# Patient Record
Sex: FEMALE | Race: WHITE | Employment: OTHER | URBAN - METROPOLITAN AREA
[De-identification: names, ages, dates, MRNs, and addresses within clinical notes are randomized per-mention and may not be internally consistent; named-entity substitution may affect disease eponyms.]

---

## 2017-01-10 PROBLEM — J81.1 PULMONARY EDEMA: Status: ACTIVE | Noted: 2017-01-10

## 2017-01-10 PROBLEM — J96.00 ACUTE RESPIRATORY FAILURE (HCC): Status: ACTIVE | Noted: 2017-01-10

## 2017-01-11 ENCOUNTER — TELEPHONE (OUTPATIENT)
Dept: FAMILY MEDICINE CLINIC | Age: 77
End: 2017-01-11

## 2017-01-12 PROBLEM — I50.33 ACUTE ON CHRONIC DIASTOLIC (CONGESTIVE) HEART FAILURE (HCC): Status: ACTIVE | Noted: 2017-01-12

## 2017-01-13 PROBLEM — J81.0 ACUTE PULMONARY EDEMA (HCC): Status: ACTIVE | Noted: 2017-01-10

## 2017-01-13 PROBLEM — J96.01 ACUTE RESPIRATORY FAILURE WITH HYPOXIA (HCC): Status: ACTIVE | Noted: 2017-01-10

## 2017-01-16 ENCOUNTER — TELEPHONE (OUTPATIENT)
Dept: CARDIAC REHAB | Age: 77
End: 2017-01-16

## 2017-01-17 ENCOUNTER — TELEPHONE (OUTPATIENT)
Dept: FAMILY MEDICINE CLINIC | Age: 77
End: 2017-01-17

## 2017-01-19 ENCOUNTER — OFFICE VISIT (OUTPATIENT)
Dept: FAMILY MEDICINE CLINIC | Age: 77
End: 2017-01-19

## 2017-01-19 VITALS
TEMPERATURE: 98.4 F | BODY MASS INDEX: 35.12 KG/M2 | OXYGEN SATURATION: 94 % | HEART RATE: 66 BPM | SYSTOLIC BLOOD PRESSURE: 90 MMHG | DIASTOLIC BLOOD PRESSURE: 50 MMHG | WEIGHT: 192 LBS

## 2017-01-19 DIAGNOSIS — I50.30 DIASTOLIC CONGESTIVE HEART FAILURE, UNSPECIFIED CONGESTIVE HEART FAILURE CHRONICITY: Primary | ICD-10-CM

## 2017-01-19 PROCEDURE — 99214 OFFICE O/P EST MOD 30 MIN: CPT | Performed by: FAMILY MEDICINE

## 2017-01-20 ENCOUNTER — TELEPHONE (OUTPATIENT)
Dept: FAMILY MEDICINE CLINIC | Age: 77
End: 2017-01-20

## 2017-01-23 RX ORDER — CARVEDILOL 12.5 MG/1
TABLET ORAL
Qty: 180 TABLET | Refills: 1 | Status: SHIPPED | OUTPATIENT
Start: 2017-01-23 | End: 2017-07-25 | Stop reason: SDUPTHER

## 2017-02-22 RX ORDER — PRAVASTATIN SODIUM 20 MG
TABLET ORAL
Qty: 90 TABLET | Refills: 1 | Status: SHIPPED | OUTPATIENT
Start: 2017-02-22 | End: 2017-08-22 | Stop reason: SDUPTHER

## 2017-02-28 RX ORDER — LISINOPRIL 10 MG/1
TABLET ORAL
Qty: 90 TABLET | Refills: 3 | Status: SHIPPED | OUTPATIENT
Start: 2017-02-28 | End: 2017-03-27 | Stop reason: DRUGHIGH

## 2017-02-28 RX ORDER — LISINOPRIL 20 MG/1
20 TABLET ORAL DAILY
Qty: 30 TABLET | Refills: 3 | Status: SHIPPED | OUTPATIENT
Start: 2017-02-28 | End: 2017-03-27 | Stop reason: SDUPTHER

## 2017-03-10 RX ORDER — FUROSEMIDE 20 MG/1
TABLET ORAL
Qty: 90 TABLET | Refills: 0 | Status: SHIPPED | OUTPATIENT
Start: 2017-03-10 | End: 2017-06-04 | Stop reason: SDUPTHER

## 2017-03-27 ENCOUNTER — OFFICE VISIT (OUTPATIENT)
Dept: FAMILY MEDICINE CLINIC | Age: 77
End: 2017-03-27

## 2017-03-27 VITALS — HEART RATE: 68 BPM | OXYGEN SATURATION: 98 % | DIASTOLIC BLOOD PRESSURE: 50 MMHG | SYSTOLIC BLOOD PRESSURE: 124 MMHG

## 2017-03-27 DIAGNOSIS — E03.9 HYPOTHYROIDISM, UNSPECIFIED TYPE: ICD-10-CM

## 2017-03-27 DIAGNOSIS — Z79.4 TYPE 2 DIABETES MELLITUS WITH COMPLICATION, WITH LONG-TERM CURRENT USE OF INSULIN (HCC): ICD-10-CM

## 2017-03-27 DIAGNOSIS — E11.8 TYPE 2 DIABETES MELLITUS WITH COMPLICATION, WITH LONG-TERM CURRENT USE OF INSULIN (HCC): ICD-10-CM

## 2017-03-27 DIAGNOSIS — I10 ESSENTIAL HYPERTENSION: Primary | ICD-10-CM

## 2017-03-27 LAB
A/G RATIO: 1.7 (ref 1.1–2.2)
ALBUMIN SERPL-MCNC: 4.3 G/DL (ref 3.4–5)
ALP BLD-CCNC: 82 U/L (ref 40–129)
ALT SERPL-CCNC: 25 U/L (ref 10–40)
ANION GAP SERPL CALCULATED.3IONS-SCNC: 15 MMOL/L (ref 3–16)
AST SERPL-CCNC: 21 U/L (ref 15–37)
BASOPHILS ABSOLUTE: 0 K/UL (ref 0–0.2)
BASOPHILS RELATIVE PERCENT: 0.6 %
BILIRUB SERPL-MCNC: 0.6 MG/DL (ref 0–1)
BUN BLDV-MCNC: 23 MG/DL (ref 7–20)
CALCIUM SERPL-MCNC: 9.6 MG/DL (ref 8.3–10.6)
CHLORIDE BLD-SCNC: 101 MMOL/L (ref 99–110)
CO2: 29 MMOL/L (ref 21–32)
CREAT SERPL-MCNC: 1 MG/DL (ref 0.6–1.2)
EOSINOPHILS ABSOLUTE: 0.1 K/UL (ref 0–0.6)
EOSINOPHILS RELATIVE PERCENT: 2.5 %
GFR AFRICAN AMERICAN: >60
GFR NON-AFRICAN AMERICAN: 54
GLOBULIN: 2.6 G/DL
GLUCOSE BLD-MCNC: 144 MG/DL (ref 70–99)
HCT VFR BLD CALC: 38 % (ref 36–48)
HEMATOLOGY PATH CONSULT: NO
HEMOGLOBIN: 12.3 G/DL (ref 12–16)
LYMPHOCYTES ABSOLUTE: 1.4 K/UL (ref 1–5.1)
LYMPHOCYTES RELATIVE PERCENT: 23.6 %
MCH RBC QN AUTO: 36.3 PG (ref 26–34)
MCHC RBC AUTO-ENTMCNC: 32.3 G/DL (ref 31–36)
MCV RBC AUTO: 112.5 FL (ref 80–100)
MONOCYTES ABSOLUTE: 0.3 K/UL (ref 0–1.3)
MONOCYTES RELATIVE PERCENT: 4.2 %
NEUTROPHILS ABSOLUTE: 4.2 K/UL (ref 1.7–7.7)
NEUTROPHILS RELATIVE PERCENT: 69.1 %
PDW BLD-RTO: 14.2 % (ref 12.4–15.4)
PLATELET # BLD: 147 K/UL (ref 135–450)
PMV BLD AUTO: 11 FL (ref 5–10.5)
POTASSIUM SERPL-SCNC: 4.8 MMOL/L (ref 3.5–5.1)
RBC # BLD: 3.37 M/UL (ref 4–5.2)
SODIUM BLD-SCNC: 145 MMOL/L (ref 136–145)
TOTAL PROTEIN: 6.9 G/DL (ref 6.4–8.2)
TSH REFLEX FT4: 0.44 UIU/ML (ref 0.27–4.2)
WBC # BLD: 6.1 K/UL (ref 4–11)

## 2017-03-27 PROCEDURE — 99214 OFFICE O/P EST MOD 30 MIN: CPT | Performed by: FAMILY MEDICINE

## 2017-03-27 PROCEDURE — 36415 COLL VENOUS BLD VENIPUNCTURE: CPT | Performed by: FAMILY MEDICINE

## 2017-03-27 RX ORDER — LISINOPRIL 20 MG/1
20 TABLET ORAL DAILY
Qty: 90 TABLET | Refills: 1 | Status: SHIPPED | OUTPATIENT
Start: 2017-03-27 | End: 2017-06-28 | Stop reason: SDUPTHER

## 2017-03-27 RX ORDER — POTASSIUM CHLORIDE 750 MG/1
TABLET, FILM COATED, EXTENDED RELEASE ORAL
Qty: 90 TABLET | Refills: 0 | Status: SHIPPED | OUTPATIENT
Start: 2017-03-27 | End: 2017-06-24 | Stop reason: SDUPTHER

## 2017-03-28 LAB
ESTIMATED AVERAGE GLUCOSE: 125.5 MG/DL
HBA1C MFR BLD: 6 %

## 2017-04-13 RX ORDER — GEMFIBROZIL 600 MG/1
TABLET, FILM COATED ORAL
Qty: 90 TABLET | Refills: 0 | Status: SHIPPED | OUTPATIENT
Start: 2017-04-13 | End: 2017-07-17 | Stop reason: SDUPTHER

## 2017-05-24 RX ORDER — LEVOTHYROXINE SODIUM 137 UG/1
TABLET ORAL
Qty: 90 TABLET | Refills: 0 | Status: SHIPPED | OUTPATIENT
Start: 2017-05-24 | End: 2017-08-22 | Stop reason: SDUPTHER

## 2017-06-05 RX ORDER — FUROSEMIDE 20 MG/1
TABLET ORAL
Qty: 90 TABLET | Refills: 0 | Status: ON HOLD | OUTPATIENT
Start: 2017-06-05 | End: 2017-09-02 | Stop reason: HOSPADM

## 2017-06-26 ENCOUNTER — OFFICE VISIT (OUTPATIENT)
Dept: FAMILY MEDICINE CLINIC | Age: 77
End: 2017-06-26

## 2017-06-26 VITALS
SYSTOLIC BLOOD PRESSURE: 134 MMHG | OXYGEN SATURATION: 96 % | HEART RATE: 69 BPM | BODY MASS INDEX: 35.85 KG/M2 | DIASTOLIC BLOOD PRESSURE: 48 MMHG | WEIGHT: 196 LBS

## 2017-06-26 DIAGNOSIS — E11.8 TYPE 2 DIABETES MELLITUS WITH COMPLICATION, WITH LONG-TERM CURRENT USE OF INSULIN (HCC): Primary | ICD-10-CM

## 2017-06-26 DIAGNOSIS — Z79.4 TYPE 2 DIABETES MELLITUS WITH COMPLICATION, WITH LONG-TERM CURRENT USE OF INSULIN (HCC): Primary | ICD-10-CM

## 2017-06-26 LAB — HBA1C MFR BLD: 5.9 %

## 2017-06-26 PROCEDURE — 83036 HEMOGLOBIN GLYCOSYLATED A1C: CPT | Performed by: FAMILY MEDICINE

## 2017-06-26 PROCEDURE — 99213 OFFICE O/P EST LOW 20 MIN: CPT | Performed by: FAMILY MEDICINE

## 2017-06-26 RX ORDER — POTASSIUM CHLORIDE 750 MG/1
TABLET, EXTENDED RELEASE ORAL
Qty: 90 TABLET | Refills: 0 | Status: ON HOLD | OUTPATIENT
Start: 2017-06-26 | End: 2017-11-21

## 2017-06-28 DIAGNOSIS — I10 ESSENTIAL HYPERTENSION: ICD-10-CM

## 2017-06-28 RX ORDER — LISINOPRIL 20 MG/1
20 TABLET ORAL DAILY
Qty: 90 TABLET | Refills: 1 | Status: SHIPPED | OUTPATIENT
Start: 2017-06-28

## 2017-07-17 RX ORDER — GEMFIBROZIL 600 MG/1
TABLET, FILM COATED ORAL
Qty: 90 TABLET | Refills: 0 | Status: SHIPPED | OUTPATIENT
Start: 2017-07-17 | End: 2017-10-25 | Stop reason: SDUPTHER

## 2017-07-25 RX ORDER — CARVEDILOL 12.5 MG/1
TABLET ORAL
Qty: 180 TABLET | Refills: 0 | Status: SHIPPED | OUTPATIENT
Start: 2017-07-25 | End: 2017-10-22 | Stop reason: SDUPTHER

## 2017-07-28 RX ORDER — HUMAN INSULIN 100 [IU]/ML
INJECTION, SOLUTION SUBCUTANEOUS
Qty: 3 VIAL | Refills: 0 | Status: SHIPPED | OUTPATIENT
Start: 2017-07-28 | End: 2017-09-25 | Stop reason: SDUPTHER

## 2017-08-22 RX ORDER — LEVOTHYROXINE SODIUM 137 UG/1
TABLET ORAL
Qty: 90 TABLET | Refills: 0 | Status: ON HOLD | OUTPATIENT
Start: 2017-08-22 | End: 2017-11-22 | Stop reason: SDUPTHER

## 2017-08-22 RX ORDER — PRAVASTATIN SODIUM 20 MG
TABLET ORAL
Qty: 90 TABLET | Refills: 0 | Status: SHIPPED | OUTPATIENT
Start: 2017-08-22 | End: 2017-11-16 | Stop reason: SDUPTHER

## 2017-08-25 ENCOUNTER — TELEPHONE (OUTPATIENT)
Dept: ORTHOPEDIC SURGERY | Age: 77
End: 2017-08-25

## 2017-08-25 PROBLEM — I25.10 CAD (CORONARY ARTERY DISEASE): Status: ACTIVE | Noted: 2017-08-25

## 2017-08-25 PROBLEM — I50.9 CHF (CONGESTIVE HEART FAILURE) (HCC): Status: ACTIVE | Noted: 2017-08-25

## 2017-09-05 RX ORDER — FUROSEMIDE 20 MG/1
TABLET ORAL
Qty: 90 TABLET | Refills: 0 | Status: SHIPPED | OUTPATIENT
Start: 2017-09-05 | End: 2017-12-01 | Stop reason: SDUPTHER

## 2017-09-06 ENCOUNTER — TELEPHONE (OUTPATIENT)
Dept: FAMILY MEDICINE CLINIC | Age: 77
End: 2017-09-06

## 2017-09-06 NOTE — TELEPHONE ENCOUNTER
Norma Dutta with [address] called and states that the patient was discharged from the hospital this past weekend and Norma Dutta wants to know if Dr. Nola Boss would sign home care orders for nursing and physical therapy.

## 2017-09-06 NOTE — TELEPHONE ENCOUNTER
Dilan Ochoa called back stating that she spoke with [facility] and she needs the office note to include vital signs, health history, and medications.  She would like this faxed to [phone]

## 2017-09-07 ENCOUNTER — TELEPHONE (OUTPATIENT)
Dept: FAMILY MEDICINE CLINIC | Age: 77
End: 2017-09-07

## 2017-09-07 NOTE — TELEPHONE ENCOUNTER
Pastora Escamilla from [facility] wants to let Chastity Crouch know that pt did not have referral for f/u home care after her ER visit this past week on 8/25/17 but pt was refusing and she thinks that is why f/u was not done. Pt is admitted today for home care ([facility]) and she will be evaluated later today by them. Pt's vitals is good she is still having a tremendous amount of pain in mid back. Pt said 10 on pain scale with movement. Pt is taking hydrocodone for pain 3 - 4 times a day for last week. Pt states it is taking the edge off pain but that is it. Pastora Escamilla will send a detailed report to Chastity Crouch.

## 2017-09-25 ENCOUNTER — OFFICE VISIT (OUTPATIENT)
Dept: FAMILY MEDICINE CLINIC | Age: 77
End: 2017-09-25

## 2017-09-25 VITALS
SYSTOLIC BLOOD PRESSURE: 126 MMHG | WEIGHT: 185 LBS | OXYGEN SATURATION: 96 % | DIASTOLIC BLOOD PRESSURE: 56 MMHG | HEART RATE: 83 BPM | BODY MASS INDEX: 33.84 KG/M2

## 2017-09-25 DIAGNOSIS — Z23 NEED FOR PROPHYLACTIC VACCINATION AND INOCULATION AGAINST INFLUENZA: ICD-10-CM

## 2017-09-25 PROCEDURE — G0008 ADMIN INFLUENZA VIRUS VAC: HCPCS | Performed by: FAMILY MEDICINE

## 2017-09-25 PROCEDURE — 90662 IIV NO PRSV INCREASED AG IM: CPT | Performed by: FAMILY MEDICINE

## 2017-09-25 PROCEDURE — 99213 OFFICE O/P EST LOW 20 MIN: CPT | Performed by: FAMILY MEDICINE

## 2017-09-25 RX ORDER — OXYCODONE AND ACETAMINOPHEN 7.5; 325 MG/1; MG/1
1 TABLET ORAL EVERY 4 HOURS PRN
Qty: 42 TABLET | Refills: 0 | Status: SHIPPED | OUTPATIENT
Start: 2017-09-25 | End: 2017-10-02

## 2017-09-27 RX ORDER — POTASSIUM CHLORIDE 750 MG/1
TABLET, FILM COATED, EXTENDED RELEASE ORAL
Qty: 90 TABLET | Refills: 0 | Status: SHIPPED | OUTPATIENT
Start: 2017-09-27

## 2017-10-23 RX ORDER — CARVEDILOL 12.5 MG/1
TABLET ORAL
Qty: 180 TABLET | Refills: 0 | Status: SHIPPED | OUTPATIENT
Start: 2017-10-23 | End: 2018-01-22 | Stop reason: SDUPTHER

## 2017-10-25 RX ORDER — GEMFIBROZIL 600 MG/1
TABLET, FILM COATED ORAL
Qty: 90 TABLET | Refills: 0 | Status: SHIPPED | OUTPATIENT
Start: 2017-10-25

## 2017-11-16 RX ORDER — PRAVASTATIN SODIUM 20 MG
TABLET ORAL
Qty: 90 TABLET | Refills: 0 | Status: SHIPPED | OUTPATIENT
Start: 2017-11-16 | End: 2018-02-18 | Stop reason: SDUPTHER

## 2017-11-16 RX ORDER — HUMAN INSULIN 100 [IU]/ML
INJECTION, SUSPENSION SUBCUTANEOUS
Qty: 3 VIAL | Refills: 2 | Status: ON HOLD | OUTPATIENT
Start: 2017-11-16 | End: 2017-12-18 | Stop reason: HOSPADM

## 2017-11-21 PROBLEM — J18.9 PNEUMONIA: Status: ACTIVE | Noted: 2017-11-21

## 2017-11-21 PROBLEM — J96.01 ACUTE RESPIRATORY FAILURE WITH HYPOXIA (HCC): Status: ACTIVE | Noted: 2017-11-21

## 2017-11-26 ENCOUNTER — CARE COORDINATION (OUTPATIENT)
Dept: CASE MANAGEMENT | Age: 77
End: 2017-11-26

## 2017-11-26 NOTE — CARE COORDINATION
[facility] Transitions Initial Follow Up Call    Call within 2 business days of discharge: Yes    Patient: Eloisa Saleh Patient : 1940   MRN: [id number]  Reason for Admission: hypoxia and community acquired RLL pneumonia   Discharge Date: 17 RARS: [facility] Risk Score: 32     Spoke with: [facility]    Non-face-to-face services provided:  Obtained and reviewed discharge summary and/or continuity of care documents    Care Transitions 24 Hour Call    Do you have any ongoing symptoms?:  No  Do you have a copy of your discharge instructions?:  Yes  Do you have all of your prescriptions and are they filled?:  Yes  Have you been contacted by a [facility] Pharmacist?:  No  Have you scheduled your follow up appointment?:  Yes  How are you going to get to your appointment?:  Car - family or friend to transport  Were you discharged with any Home Care or Post Acute Services:  Yes  Post Acute Services:  Home Health (Comment: Dana)  Patient DME:  Paralee Ian, Wheelchair  Do you have support at home?:  Partner/Spouse/SO  Do you feel like you have everything you need to keep you well at home?:  Yes  Are you an active caregiver in your home?:  Yes  Care Transitions Interventions       Janelle Loya reports she is feeling okay, denies any concerning symptoms. Asked if she weighed herself, states she has not yet as she is just waking up. States she has all her prescriptions and refused medication review. Informed her of follow up appointment on 17 with NP at [facility]. States her Daughter will provide transportation. Informed of need to schedule follow up CXR in 4-6 weeks, Pulmonology in 4-6 weeks, she voiced understanding. Denies any needs, questions or concerns at this time. Is agreeable to care transition.  Jes Ramesh LPN     Care  [phone]    Hanna 5, a nurse is scheduled to visit today.      Follow Up  Future Appointments  Date Time Provider Gina Sita   11/28/2017 1:00 PM Jin Xiao NP [address]   1/8/2018 1:15 PM Alanna Apley, MD [facility]   1/15/2018 2:20 PM MD Ragini Urrutia [facility]

## 2017-11-27 ENCOUNTER — TELEPHONE (OUTPATIENT)
Dept: FAMILY MEDICINE CLINIC | Age: 77
End: 2017-11-27

## 2017-11-27 ENCOUNTER — TELEPHONE (OUTPATIENT)
Dept: PHARMACY | Facility: CLINIC | Age: 77
End: 2017-11-27

## 2017-11-27 NOTE — TELEPHONE ENCOUNTER
Jada Martin with [address] [place] [address] called and states that the patient was discharged from [facility] on Saturday 11.25.17. Jada Martin wanted to know if Dr. Leontine Angelucci will sign orders for skilled nursing. Please advise.

## 2017-11-27 NOTE — TELEPHONE ENCOUNTER
CLINICAL PHARMACY NOTE - Post-Discharge Transitions of Care [facility])    Patient discharged from [facility] on 11/25/2017. First attempt made to reach patient by telephone for medication review. Patient currently sleeping, requests call back tomorrow. Will continue to attempt to contact patient by telephone as appropriate.     Chance Cunningham, PharmD  Clinical Pharmacy Specialist  O: [phone]  C: [address] [place] [address]  [phone], Option 7    =======================================================    For Pharmacy Admin Tracking Only  TCM Call Made?: Yes

## 2017-11-28 ENCOUNTER — CARE COORDINATION (OUTPATIENT)
Dept: CASE MANAGEMENT | Age: 77
End: 2017-11-28

## 2017-11-28 NOTE — TELEPHONE ENCOUNTER
CLINICAL PHARMACY NOTE - Post-Discharge Transitions of Care ([facility])    Second attempt made to reach patient by telephone for medication review. Left voice message for patient to return clinician's phone call to [phone]. Will prepare letter and mail to patient.      Mel Hui PharmD, [facility] Specialist  O: [phone]  C: [address] [place] [address]  [phone], Option 7    =======================================================    For Pharmacy Admin Tracking Only  TCM Call Made?: Yes  [facility] Select Patient?: Yes  Outreach Status: Patient Unreachable  Care Coordinator Outreach to Patient?: Yes  Provider Contacted?: No  Time Spent (min): 5

## 2017-11-28 NOTE — CARE COORDINATION
[facility] Transitions Follow Up Call    2017    Patient: Eloisa Saleh  Patient : 1940   MRN: [id number]  Reason for Admission: Pneumonia  Discharge Date: 17 RARS: Risk Score: 26 CMRS 11       Attempted to reach patient via phone for care transition, no answer. VM left on home phone stating purpose of call along with my contact information requesting a return call. Mobile phone has VM box that is not set up, unable to leave message.      Quiana Bustamante RN  Care Transitions Coordinator      Follow Up  Future Appointments  Date Time Provider Gina Buckner   2017 3:40 PM YAYO Campbell [facility]   2017 3:00 PM Aicha Moralez NP [address]   2018 1:15 PM MD ARNOLDO Sweeney [facility]   1/15/2018 2:20 PM MD Megan Sebastian [facility]       Quiana Bustamante RN

## 2017-11-30 NOTE — CARE COORDINATION
[facility] Transitions Follow Up Call    2017    Patient: Claire Matute  Patient : 1940   MRN: [id number]  Reason for Admission: Pneumonia  Discharge Date: 17 RARS: Risk Score: 26 CMRS 11       3rd and final attempt made to reach patient for initial post hospital follow up call. Left a voice message for patient with my contact information and informed of final outreach attempt. Also left reminder about her appt today with Roxanne Bolden at 3pm.  VM box not set up on mobile phone.     Keyur Martin RN  Care Transitions Coordinator  [phone]          Follow Up  Future Appointments  Date Time Provider Gina Buckner   2017 3:00 PM YAYO Arzola [facility]   2017 3:00 PM YAYO Arzola [facility]   2018 1:15 PM MD ARNOLDO Contreras [facility]   1/15/2018 2:20 PM MD Markus Louise [facility]       Keyur Martin RN

## 2017-11-30 NOTE — ADDENDUM NOTE
Encounter addended by: Anna Wilson MA on: 11/30/2017  9:29 AM<BR>    Actions taken: Letter status changed

## 2017-12-01 RX ORDER — FUROSEMIDE 20 MG/1
TABLET ORAL
Qty: 90 TABLET | Refills: 0 | Status: ON HOLD | OUTPATIENT
Start: 2017-12-01 | End: 2017-12-18

## 2017-12-13 PROBLEM — Z01.810 HIGH RISK SURGERY, PRE-OPERATIVE CARDIOVASCULAR EXAMINATION: Status: ACTIVE | Noted: 2017-12-13

## 2017-12-13 PROBLEM — S82.891A BILATERAL ANKLE FRACTURES, CLOSED, INITIAL ENCOUNTER: Status: ACTIVE | Noted: 2017-12-13

## 2017-12-13 PROBLEM — S82.892A BILATERAL ANKLE FRACTURES: Status: ACTIVE | Noted: 2017-12-13

## 2017-12-13 PROBLEM — S82.891A BILATERAL ANKLE FRACTURES: Status: ACTIVE | Noted: 2017-12-13

## 2017-12-13 PROBLEM — S82.892A BILATERAL ANKLE FRACTURES, CLOSED, INITIAL ENCOUNTER: Status: ACTIVE | Noted: 2017-12-13

## 2017-12-19 PROBLEM — I34.2 NON-RHEUMATIC MITRAL VALVE STENOSIS: Status: ACTIVE | Noted: 2017-12-19

## 2017-12-19 PROBLEM — I48.0 PAF (PAROXYSMAL ATRIAL FIBRILLATION) (HCC): Status: ACTIVE | Noted: 2017-12-19

## 2017-12-20 ENCOUNTER — TELEPHONE (OUTPATIENT)
Dept: FAMILY MEDICINE CLINIC | Age: 77
End: 2017-12-20

## 2017-12-20 NOTE — TELEPHONE ENCOUNTER
Patient's daughter is calling regarding the patient being dismissed from the practice for no shows. Daughter stated the patient has been so sick that she has been unable to get her out. Stated the patient did have a nurse coming to see her and the patient would not tell her until the day of how sick she was to cancel her appt. She said the patient had pneumonia and the flu and she was unable to make it to her appts. Daughter stated when she was able to get her out she fell and broke both of her ankles. Patient is currently in [facility] recovering from the ankle surgery. Jeff Modi stated that the patient is going to a rehab facility for the next four to five months and then she plans on putting her into a nursing home. She is asking if Reba Melendez can still be her PCP because it is going to be hard for her to find a new doctor with her mother's condition. She stated that she will only need him if she ever has to take her out of the nursing home and possibly if the rehab facility needs to speak to him at all.

## 2018-01-09 ENCOUNTER — TELEPHONE (OUTPATIENT)
Dept: FAMILY MEDICINE CLINIC | Age: 78
End: 2018-01-09

## 2018-01-09 NOTE — TELEPHONE ENCOUNTER
Sharmin King from [address] is calling about the Face to Face she has faxed over three times since November and has not received anything back. She also needs the office notes from patient's last OV with [address] faxed to [phone].

## 2018-01-09 NOTE — TELEPHONE ENCOUNTER
Spoke with Earl Myrick and told her that per [facility] it was faxed out yesterday. [facility] will check tomorrow when she gets back to [facility] office if it did not go through. Earl Myrick will also call me back if she did not get it.

## 2018-01-15 ENCOUNTER — OFFICE VISIT (OUTPATIENT)
Dept: ENDOCRINOLOGY | Age: 78
End: 2018-01-15

## 2018-01-15 VITALS
WEIGHT: 194 LBS | SYSTOLIC BLOOD PRESSURE: 130 MMHG | DIASTOLIC BLOOD PRESSURE: 52 MMHG | HEART RATE: 68 BPM | RESPIRATION RATE: 12 BRPM | HEIGHT: 65 IN | BODY MASS INDEX: 32.32 KG/M2 | OXYGEN SATURATION: 97 %

## 2018-01-15 DIAGNOSIS — Z79.4 TYPE 2 DIABETES MELLITUS WITH COMPLICATION, WITH LONG-TERM CURRENT USE OF INSULIN (HCC): Primary | ICD-10-CM

## 2018-01-15 DIAGNOSIS — E11.8 TYPE 2 DIABETES MELLITUS WITH COMPLICATION, WITH LONG-TERM CURRENT USE OF INSULIN (HCC): Primary | ICD-10-CM

## 2018-01-15 PROCEDURE — 99213 OFFICE O/P EST LOW 20 MIN: CPT | Performed by: INTERNAL MEDICINE

## 2018-01-15 NOTE — PROGRESS NOTES
breakfast) 30 capsule 1    budesonide-formoterol (SYMBICORT) 80-4.5 MCG/ACT AERO Inhale 2 puffs into the lungs 2 times daily 1 Inhaler 3    albuterol-ipratropium (COMBIVENT RESPIMAT)  MCG/ACT AERS inhaler Inhale 1 puff into the lungs every 4 hours as needed for Wheezing 1 Inhaler 2    levothyroxine (SYNTHROID) 137 MCG tablet TAKE ONE TABLET BY MOUTH DAILY 90 tablet 1    pravastatin (PRAVACHOL) 20 MG tablet TAKE ONE TABLET BY MOUTH DAILY 90 tablet 0    gemfibrozil (LOPID) 600 MG tablet TAKE ONE TABLET BY MOUTH DAILY 90 tablet 0    carvedilol (COREG) 12.5 MG tablet TAKE ONE TABLET BY MOUTH TWICE A DAY WITH MEALS 180 tablet 0    potassium chloride (KLOR-CON) 10 MEQ extended release tablet TAKE ONE TABLET BY MOUTH DAILY 90 tablet 0    lisinopril (PRINIVIL;ZESTRIL) 20 MG tablet Take 1 tablet by mouth daily 90 tablet 1    calcium carbonate (OSCAL) 500 MG TABS tablet Take 500 mg by mouth daily      traZODone (DESYREL) 50 MG tablet TAKE ONE TO TWO TABLETS BY MOUTH PRN AT BEDTIME 180 tablet 2    glucose blood VI test strips (ACCU-CHEK SUBHASH PLUS) strip USE TO TEST BLOOD GLUCOSE LEVELS FOUR TIMES A DAY AS NEEDED. DX CODE 250.00 150 each 6    Accu-Chek Softclix Lancets MISC 1 each by Does not apply route 4 times daily Dx Code 250.00 150 each 6    Omega-3 Fatty Acids (OMEGA-3 PLUS PO) Take 1 capsule by mouth daily.  triamcinolone (NASACORT AQ) 55 MCG/ACT nasal inhaler 2 sprays by Nasal route daily.  Blood Glucose Monitoring Suppl (ACCU-CHEK ACTIVE CARE KIT) KIT 4 times a day 1 kit 0    aspirin 81 MG EC tablet Take 162 mg by mouth daily Take two daily, stopped for surgery      Coenzyme Q10 (CO Q-10) 100 MG CAPS Take 1 capsule by mouth Daily. Vitals:    01/15/18 1423   BP: (!) 130/52   Site: Right Arm   Position: Sitting   Cuff Size: Medium Adult   Pulse: 68   Resp: 12   SpO2: 97%   Weight: 194 lb (88 kg)   Height: 5' 5\" (1.651 m)     Body mass index is 32.28 kg/m².      Wt Readings from  Quit date: 4/1/2005   Smokeless Tobacco    Never Used      History   Alcohol Use No       HPI      Steve Manriquez is a 69 y.o. female who is here for a follow-up for management of DM. Previously saw Dr. Rangel Tiwari  PCP Kaleb Rizzo MD       Patient has a PMH of Type 2 DM, hypertension, hyperlipidemia, obesity ,  CAD s/p CABG, Hypothyroidism, GERD. She was last seen in 11/16    Interim history. Had bilateral ankle fractures in 12/17    Diagnosed with Diabetes Mellitus type 2 for 4-5 yrs. Course has been variable . Microvascular complications: No known retinopathy (Last eye exam: 2014), No nephropathy . Has Peripheral neuropathy. Has disc disease in her spine. Home regimen:  Currently on NPH 12 units A.M  Humalog SSI      Blood glucose trend  100-150    Diet: Eats 3 meals/day. Review of Systems   Constitutional: Positive for malaise/fatigue. Negative for fever, chills, weight loss and diaphoresis. Eyes: Negative for blurred vision, double vision and photophobia. Respiratory: Negative for cough and hemoptysis. Cardiovascular: Negative for chest pain, palpitations and orthopnea. Genitourinary: Negative for dysuria, urgency, frequency, hematuria and flank pain. Musculoskeletal: Positive for myalgias. Negative for back pain, joint pain, falls and neck pain. Skin: Negative for itching and rash. Neurological: Positive for headaches. Negative for dizziness, tingling, tremors, sensory change, speech change, focal weakness, seizures and loss of consciousness. Endo/Heme/Allergies: Negative for environmental allergies and polydipsia. Does not bruise/bleed easily. Psychiatric/Behavioral: Positive for depression. Negative for suicidal ideas, hallucinations, memory loss and substance abuse. The patient is not nervous/anxious and does not have insomnia. Physical Exam   Constitutional: She is oriented to person, place, and time. She appears well-developed.    Psychiatric: Her

## 2018-01-18 ENCOUNTER — OFFICE VISIT (OUTPATIENT)
Dept: ORTHOPEDIC SURGERY | Age: 78
End: 2018-01-18

## 2018-01-18 VITALS — BODY MASS INDEX: 34.38 KG/M2 | HEIGHT: 63 IN | WEIGHT: 194 LBS

## 2018-01-18 DIAGNOSIS — M25.572 CHRONIC ANKLE PAIN, BILATERAL: Primary | ICD-10-CM

## 2018-01-18 DIAGNOSIS — M25.571 CHRONIC ANKLE PAIN, BILATERAL: Primary | ICD-10-CM

## 2018-01-18 DIAGNOSIS — G89.29 CHRONIC ANKLE PAIN, BILATERAL: Primary | ICD-10-CM

## 2018-01-18 DIAGNOSIS — S82.892A BILATERAL ANKLE FRACTURES, CLOSED, INITIAL ENCOUNTER: ICD-10-CM

## 2018-01-18 DIAGNOSIS — S82.891A BILATERAL ANKLE FRACTURES, CLOSED, INITIAL ENCOUNTER: ICD-10-CM

## 2018-01-18 PROCEDURE — 99024 POSTOP FOLLOW-UP VISIT: CPT | Performed by: ORTHOPAEDIC SURGERY

## 2018-01-18 PROCEDURE — 73610 X-RAY EXAM OF ANKLE: CPT | Performed by: ORTHOPAEDIC SURGERY

## 2018-01-23 RX ORDER — CARVEDILOL 12.5 MG/1
TABLET ORAL
Qty: 180 TABLET | Refills: 1 | Status: SHIPPED | OUTPATIENT
Start: 2018-01-23

## 2018-02-19 RX ORDER — PRAVASTATIN SODIUM 20 MG
TABLET ORAL
Qty: 90 TABLET | Refills: 0 | Status: SHIPPED | OUTPATIENT
Start: 2018-02-19

## 2018-03-01 RX ORDER — FUROSEMIDE 20 MG/1
TABLET ORAL
Qty: 90 TABLET | Refills: 0 | Status: SHIPPED | OUTPATIENT
Start: 2018-03-01

## 2018-04-11 PROBLEM — Z01.810 HIGH RISK SURGERY, PRE-OPERATIVE CARDIOVASCULAR EXAMINATION: Status: RESOLVED | Noted: 2017-12-13 | Resolved: 2018-04-11

## 2018-05-17 ENCOUNTER — TELEPHONE (OUTPATIENT)
Dept: PHARMACY | Facility: CLINIC | Age: 78
End: 2018-05-17